# Patient Record
Sex: MALE | NOT HISPANIC OR LATINO | Employment: OTHER | URBAN - METROPOLITAN AREA
[De-identification: names, ages, dates, MRNs, and addresses within clinical notes are randomized per-mention and may not be internally consistent; named-entity substitution may affect disease eponyms.]

---

## 2021-10-20 NOTE — PROGRESS NOTES
[facility] Cardiology  [address], [address]  [place], [place], [id number] [phone]  WWW.[facility]           OUTPATIENT CLINIC CONSULTATION NOTE    Patient care team:  Patient Care Team:  Sommer Sepulveda MD as PCP - General (Family Medicine)    Requesting Provider and Reason for consultation: The patient is being seen today at the request of Sommer Sepulveda MD for dyspnea.     Subjective:   Chief complaint:   Chief Complaint   Patient presents with   • Shortness of Breath       HPI:    Jay Fagan is a 71 y.o. male.  Partial problem list, including cardiac problems:  1. Hyperlipidemia  2. Asthma  3. Anemia    The patient presents today for consultation.    He reports that he has had ongoing worsening dyspnea with exertion over the past year.  He denies chest pain, orthopnea, PND.  Has mild lower extremity edema during long trips.  Palpitations at nighttime.  He denies any prior cardiac testing.  He notes a familial history of CAD with his mother having an MI at age 64.  He denies tobacco, alcohol, or drug use.  He remains active.    Review of Systems:  Positive for dyspnea with exertion  All other systems are reviewed and are negative    PFSH:  Patient Active Problem List   Diagnosis   • Hyperlipidemia   • Asthma   • Iron deficiency anemia         Current Outpatient Medications:   •  albuterol sulfate  (90 Base) MCG/ACT inhaler, Inhale 2 puffs Every 4 (Four) Hours As Needed., Disp: , Rfl:   •  atorvastatin (LIPITOR) 10 MG tablet, Take 1 tablet by mouth Daily., Disp: , Rfl:   •  ferrous sulfate 325 (65 FE) MG tablet, Take 325 mg by mouth 3 (Three) Times a Day With Meals., Disp: , Rfl:   •  montelukast (SINGULAIR) 10 MG tablet, Take 1 tablet by mouth Daily., Disp: , Rfl:   •  multivitamin with minerals (ICAPS) tablet tablet, Take 1 tablet by mouth 2 (two) times a day., Disp: , Rfl:     No Known Allergies    Social History     Socioeconomic History   • Marital status:  "   Tobacco Use   • Smoking status: Never Smoker   • Smokeless tobacco: Never Used   Substance and Sexual Activity   • Alcohol use: Never   • Drug use: Never     Family History   Problem Relation Age of Onset   • Heart attack Mother    • Diabetes Father          Objective:   Physical Exam:  /56 (BP Location: Left arm, Patient Position: Sitting, Cuff Size: Adult)   Pulse 68   Ht 162.6 cm (64\")   Wt 76.7 kg (169 lb)   SpO2 97%   BMI 29.01 kg/m²   CONSTITUTIONAL: Well-nourished. In no acute distress.   SKIN: Warm and dry. No rashes noted  HEENT: Head is normocephalic and atraumatic.   NECK: Supple without masses or thyromegaly.   LUNGS: Normal effort. Clear to auscultation bilaterally without wheezing, rhonchi, or rales noted.   CARDIOVASCULAR: Regular rate and rhythm with a normal S1 and S2. There is no murmur, gallop, rub, or click appreciated. Carotid upstrokes are 2+ and symmetrical without bruits.  2+ radial pulses bilaterally.  There is no peripheral edema.   ABDOMEN: Normal bowel sounds.  Nondistended.  MUSCULOSKELETAL:  No digital cyanosis  NEUROLOGICAL: Nonfocal.  PSYCHIATRIC: Alert, orientated, appropriate affect and mood      Labs:  No results found for: BUN, CREATININE, K, ALT, AST  No results found for: WBC, HGB, HCT, PLT    No results found for: CHOL  No results found for: TRIG  No results found for: HDL  No results found for: LDL  No components found for: LDLDIRECTC    Diagnostic Data:      ECG 12 Lead    Date/Time: 10/22/2021 11:52 AM  Performed by: Mario Vu MD  Authorized by: Mario Vu MD   Comparison: compared with previous ECG from 9/16/2021  Similar to previous ECG  Rhythm: sinus rhythm  Rate: normal  BPM: 68  Comments: QRS 80 ms,  ms                Assessment and Plan:   Diagnoses and all orders for this visit:    Dyspnea on exertion (Primary)  Anginal equivalent (HCC)  Palpitations  -Ongoing significant dyspnea with exertion.  Likely multifactorial with chronic " anemia and asthma.  -Transthoracic echocardiogram to evaluate for structural abnormalities.  -Exercise nuclear stress test for evaluation of ischemia.  -48-hour Holter to evaluate for arrhythmia.  -Patient encouraged to exercise/walk regularly for better aerobic conditioning.    Hyperlipemia, mixed  -Statin therapy    - Return in about 6 weeks (around 12/3/2021).    Scribed for Mario Vu MD by CHRYSTAL Rodríguez. 10/22/2021  12:20 EDT     I, Mario Vu MD, personally performed the services described in this documentation as scribed by the above named individual in my presence, and it is both accurate and complete.  10/25/2021  07:19 EDT

## 2021-10-22 ENCOUNTER — OFFICE VISIT (OUTPATIENT)
Dept: CARDIOLOGY | Facility: CLINIC | Age: 71
End: 2021-10-22

## 2021-10-22 VITALS
BODY MASS INDEX: 28.85 KG/M2 | HEART RATE: 68 BPM | DIASTOLIC BLOOD PRESSURE: 56 MMHG | OXYGEN SATURATION: 97 % | WEIGHT: 169 LBS | HEIGHT: 64 IN | SYSTOLIC BLOOD PRESSURE: 118 MMHG

## 2021-10-22 DIAGNOSIS — R06.09 DYSPNEA ON EXERTION: Primary | ICD-10-CM

## 2021-10-22 DIAGNOSIS — E78.2 HYPERLIPEMIA, MIXED: ICD-10-CM

## 2021-10-22 DIAGNOSIS — I20.8 ANGINAL EQUIVALENT (HCC): ICD-10-CM

## 2021-10-22 DIAGNOSIS — R00.2 PALPITATIONS: ICD-10-CM

## 2021-10-22 PROBLEM — J45.909 ASTHMA: Status: ACTIVE | Noted: 2021-10-22

## 2021-10-22 PROBLEM — E78.5 HYPERLIPIDEMIA: Status: ACTIVE | Noted: 2021-10-22

## 2021-10-22 PROBLEM — D50.9 IRON DEFICIENCY ANEMIA: Status: ACTIVE | Noted: 2021-10-22

## 2021-10-22 PROCEDURE — 99204 OFFICE O/P NEW MOD 45 MIN: CPT | Performed by: INTERNAL MEDICINE

## 2021-10-22 PROCEDURE — 93000 ELECTROCARDIOGRAM COMPLETE: CPT | Performed by: INTERNAL MEDICINE

## 2021-10-22 RX ORDER — MULTIPLE VITAMINS W/ MINERALS TAB 9MG-400MCG
1 TAB ORAL 2 TIMES DAILY
COMMUNITY

## 2021-10-22 RX ORDER — ALBUTEROL SULFATE 90 UG/1
2 AEROSOL, METERED RESPIRATORY (INHALATION) EVERY 4 HOURS PRN
COMMUNITY
Start: 2021-09-09

## 2021-10-22 RX ORDER — ATORVASTATIN CALCIUM 10 MG/1
1 TABLET, FILM COATED ORAL DAILY
COMMUNITY
Start: 2021-09-09

## 2021-10-22 RX ORDER — FERROUS SULFATE 325(65) MG
325 TABLET ORAL
COMMUNITY

## 2021-10-22 RX ORDER — MONTELUKAST SODIUM 10 MG/1
1 TABLET ORAL DAILY
COMMUNITY
Start: 2021-09-13

## 2021-12-09 ENCOUNTER — HOSPITAL ENCOUNTER (OUTPATIENT)
Dept: CARDIOLOGY | Facility: HOSPITAL | Age: 71
Discharge: HOME | End: 2021-12-09

## 2021-12-09 VITALS — BODY MASS INDEX: 28.87 KG/M2 | WEIGHT: 169.09 LBS | HEIGHT: 64 IN

## 2021-12-09 VITALS
HEIGHT: 64 IN | BODY MASS INDEX: 28.85 KG/M2 | SYSTOLIC BLOOD PRESSURE: 130 MMHG | HEART RATE: 57 BPM | WEIGHT: 169 LBS | DIASTOLIC BLOOD PRESSURE: 90 MMHG | OXYGEN SATURATION: 95 %

## 2021-12-09 DIAGNOSIS — I20.8 ANGINAL EQUIVALENT (HCC): ICD-10-CM

## 2021-12-09 DIAGNOSIS — R06.09 DYSPNEA ON EXERTION: ICD-10-CM

## 2021-12-09 DIAGNOSIS — R00.2 PALPITATIONS: ICD-10-CM

## 2021-12-09 LAB
BH CV ECHO MEAS - AO MAX PG (FULL): 1.5 MMHG
BH CV ECHO MEAS - AO MAX PG: 4.9 MMHG
BH CV ECHO MEAS - AO MEAN PG (FULL): 0.92 MMHG
BH CV ECHO MEAS - AO MEAN PG: 2.7 MMHG
BH CV ECHO MEAS - AO ROOT AREA (BSA CORRECTED): 1.7
BH CV ECHO MEAS - AO ROOT AREA: 7.1 CM^2
BH CV ECHO MEAS - AO ROOT DIAM: 3 CM
BH CV ECHO MEAS - AO V2 MAX: 110.5 CM/SEC
BH CV ECHO MEAS - AO V2 MEAN: 78.5 CM/SEC
BH CV ECHO MEAS - AO V2 VTI: 22.8 CM
BH CV ECHO MEAS - AVA(I,A): 2.4 CM^2
BH CV ECHO MEAS - AVA(I,D): 2.4 CM^2
BH CV ECHO MEAS - AVA(V,A): 2.3 CM^2
BH CV ECHO MEAS - AVA(V,D): 2.3 CM^2
BH CV ECHO MEAS - BSA(HAYCOCK): 1.9 M^2
BH CV ECHO MEAS - BSA: 1.8 M^2
BH CV ECHO MEAS - BZI_BMI: 29 KILOGRAMS/M^2
BH CV ECHO MEAS - BZI_METRIC_HEIGHT: 162.6 CM
BH CV ECHO MEAS - BZI_METRIC_WEIGHT: 76.7 KG
BH CV ECHO MEAS - EDV(CUBED): 95.9 ML
BH CV ECHO MEAS - EDV(MOD-SP2): 55 ML
BH CV ECHO MEAS - EDV(MOD-SP4): 67 ML
BH CV ECHO MEAS - EDV(TEICH): 96.2 ML
BH CV ECHO MEAS - EF(CUBED): 72.6 %
BH CV ECHO MEAS - EF(MOD-BP): 61 %
BH CV ECHO MEAS - EF(MOD-SP2): 61.8 %
BH CV ECHO MEAS - EF(MOD-SP4): 61.2 %
BH CV ECHO MEAS - EF(TEICH): 64.4 %
BH CV ECHO MEAS - ESV(CUBED): 26.3 ML
BH CV ECHO MEAS - ESV(MOD-SP2): 21 ML
BH CV ECHO MEAS - ESV(MOD-SP4): 26 ML
BH CV ECHO MEAS - ESV(TEICH): 34.3 ML
BH CV ECHO MEAS - FS: 35 %
BH CV ECHO MEAS - IVS/LVPW: 1.2
BH CV ECHO MEAS - IVSD: 0.96 CM
BH CV ECHO MEAS - LA DIMENSION: 3.3 CM
BH CV ECHO MEAS - LA/AO: 1.1
BH CV ECHO MEAS - LAD MAJOR: 4.6 CM
BH CV ECHO MEAS - LAT PEAK E' VEL: 8.5 CM/SEC
BH CV ECHO MEAS - LATERAL E/E' RATIO: 10.8
BH CV ECHO MEAS - LV DIASTOLIC VOL/BSA (35-75): 36.8 ML/M^2
BH CV ECHO MEAS - LV IVRT: 0.07 SEC
BH CV ECHO MEAS - LV MASS(C)D: 135 GRAMS
BH CV ECHO MEAS - LV MASS(C)DI: 74.1 GRAMS/M^2
BH CV ECHO MEAS - LV MAX PG: 3.4 MMHG
BH CV ECHO MEAS - LV MEAN PG: 1.8 MMHG
BH CV ECHO MEAS - LV SYSTOLIC VOL/BSA (12-30): 14.3 ML/M^2
BH CV ECHO MEAS - LV V1 MAX: 92.4 CM/SEC
BH CV ECHO MEAS - LV V1 MEAN: 62 CM/SEC
BH CV ECHO MEAS - LV V1 VTI: 19.9 CM
BH CV ECHO MEAS - LVIDD: 4.6 CM
BH CV ECHO MEAS - LVIDS: 3 CM
BH CV ECHO MEAS - LVLD AP2: 6.5 CM
BH CV ECHO MEAS - LVLD AP4: 6.9 CM
BH CV ECHO MEAS - LVLS AP2: 5.5 CM
BH CV ECHO MEAS - LVLS AP4: 5.6 CM
BH CV ECHO MEAS - LVOT AREA (M): 2.8 CM^2
BH CV ECHO MEAS - LVOT AREA: 2.7 CM^2
BH CV ECHO MEAS - LVOT DIAM: 1.9 CM
BH CV ECHO MEAS - LVPWD: 0.83 CM
BH CV ECHO MEAS - MED PEAK E' VEL: 9.2 CM/SEC
BH CV ECHO MEAS - MEDIAL E/E' RATIO: 9.9
BH CV ECHO MEAS - MV A MAX VEL: 74.8 CM/SEC
BH CV ECHO MEAS - MV DEC SLOPE: 381.8 CM/SEC^2
BH CV ECHO MEAS - MV DEC TIME: 0.19 SEC
BH CV ECHO MEAS - MV E MAX VEL: 92.5 CM/SEC
BH CV ECHO MEAS - MV E/A: 1.2
BH CV ECHO MEAS - MV MAX PG: 5.1 MMHG
BH CV ECHO MEAS - MV MEAN PG: 2 MMHG
BH CV ECHO MEAS - MV P1/2T MAX VEL: 112.9 CM/SEC
BH CV ECHO MEAS - MV P1/2T: 86.6 MSEC
BH CV ECHO MEAS - MV V2 MAX: 113.4 CM/SEC
BH CV ECHO MEAS - MV V2 MEAN: 62 CM/SEC
BH CV ECHO MEAS - MV V2 VTI: 39.3 CM
BH CV ECHO MEAS - MVA P1/2T LCG: 1.9 CM^2
BH CV ECHO MEAS - MVA(P1/2T): 2.5 CM^2
BH CV ECHO MEAS - MVA(VTI): 1.4 CM^2
BH CV ECHO MEAS - PA ACC SLOPE: 578.9 CM/SEC^2
BH CV ECHO MEAS - PA ACC TIME: 0.12 SEC
BH CV ECHO MEAS - PA MAX PG: 4.3 MMHG
BH CV ECHO MEAS - PA PR(ACCEL): 23.5 MMHG
BH CV ECHO MEAS - PA V2 MAX: 103.2 CM/SEC
BH CV ECHO MEAS - PI END-D VEL: 60.5 CM/SEC
BH CV ECHO MEAS - RAP SYSTOLE: 3 MMHG
BH CV ECHO MEAS - RVSP: 37 MMHG
BH CV ECHO MEAS - SI(AO): 89.3 ML/M^2
BH CV ECHO MEAS - SI(CUBED): 38.2 ML/M^2
BH CV ECHO MEAS - SI(LVOT): 30 ML/M^2
BH CV ECHO MEAS - SI(MOD-SP2): 18.7 ML/M^2
BH CV ECHO MEAS - SI(MOD-SP4): 22.5 ML/M^2
BH CV ECHO MEAS - SI(TEICH): 34 ML/M^2
BH CV ECHO MEAS - SV(AO): 162.6 ML
BH CV ECHO MEAS - SV(CUBED): 69.6 ML
BH CV ECHO MEAS - SV(LVOT): 54.6 ML
BH CV ECHO MEAS - SV(MOD-SP2): 34 ML
BH CV ECHO MEAS - SV(MOD-SP4): 41 ML
BH CV ECHO MEAS - SV(TEICH): 62 ML
BH CV ECHO MEAS - TAPSE (>1.6): 2 CM
BH CV ECHO MEAS - TR MAX PG: 34 MMHG
BH CV ECHO MEAS - TR MAX VEL: 289.9 CM/SEC
BH CV ECHO MEASUREMENTS AVERAGE E/E' RATIO: 10.45
BH CV REST NUCLEAR ISOTOPE DOSE: 9.9 MCI
BH CV STRESS BP STAGE 2: NORMAL
BH CV STRESS BP STAGE 4: NORMAL
BH CV STRESS COMMENTS STAGE 1: NORMAL
BH CV STRESS DOSE REGADENOSON STAGE 1: 0.4
BH CV STRESS DURATION MIN STAGE 1: 1
BH CV STRESS DURATION MIN STAGE 2: 1
BH CV STRESS DURATION MIN STAGE 3: 1
BH CV STRESS DURATION MIN STAGE 4: 1
BH CV STRESS DURATION SEC STAGE 1: 0
BH CV STRESS DURATION SEC STAGE 2: 0
BH CV STRESS DURATION SEC STAGE 3: 0
BH CV STRESS DURATION SEC STAGE 4: 0
BH CV STRESS HR STAGE 1: 68
BH CV STRESS HR STAGE 2: 91
BH CV STRESS HR STAGE 3: 85
BH CV STRESS HR STAGE 4: 76
BH CV STRESS NUCLEAR ISOTOPE DOSE: 32.1 MCI
BH CV STRESS O2 STAGE 1: 96
BH CV STRESS O2 STAGE 2: 95
BH CV STRESS O2 STAGE 3: 98
BH CV STRESS O2 STAGE 4: 97
BH CV STRESS PROTOCOL 1: NORMAL
BH CV STRESS RECOVERY BP: NORMAL MMHG
BH CV STRESS RECOVERY HR: 61 BPM
BH CV STRESS RECOVERY O2: 96 %
BH CV STRESS STAGE 1: 1
BH CV STRESS STAGE 2: 2
BH CV STRESS STAGE 3: 3
BH CV STRESS STAGE 4: 4
BH CV VAS BP LEFT ARM: NORMAL MMHG
BH CV XLRA - RV BASE: 4 CM
BH CV XLRA - RV LENGTH: 6.3 CM
BH CV XLRA - RV MID: 3.4 CM
BH CV XLRA - TDI S': 12.6 CM/SEC
LEFT ATRIUM VOLUME INDEX: 26.4 ML/M^2
LEFT ATRIUM VOLUME: 48 ML
LV EF 2D ECHO EST: 60 %
LV EF NUC BP: 73 %
MAXIMAL PREDICTED HEART RATE: 147 BPM
PERCENT MAX PREDICTED HR: 65.99 %
STRESS BASELINE BP: NORMAL MMHG
STRESS BASELINE HR: 59 BPM
STRESS O2 SAT REST: 95 %
STRESS PERCENT HR: 78 %
STRESS POST ESTIMATED WORKLOAD: 1 METS
STRESS POST EXERCISE DUR MIN: 4 MIN
STRESS POST EXERCISE DUR SEC: 0 SEC
STRESS POST O2 SAT PEAK: 97 %
STRESS POST PEAK BP: NORMAL MMHG
STRESS POST PEAK HR: 97 BPM
STRESS TARGET HR: 125 BPM

## 2021-12-09 PROCEDURE — 93306 TTE W/DOPPLER COMPLETE: CPT | Performed by: INTERNAL MEDICINE

## 2021-12-09 PROCEDURE — 78452 HT MUSCLE IMAGE SPECT MULT: CPT

## 2021-12-09 PROCEDURE — 25010000002 REGADENOSON 0.4 MG/5ML SOLUTION: Performed by: NURSE PRACTITIONER

## 2021-12-09 PROCEDURE — 0 TECHNETIUM SESTAMIBI: Performed by: NURSE PRACTITIONER

## 2021-12-09 PROCEDURE — A9500 TC99M SESTAMIBI: HCPCS | Performed by: NURSE PRACTITIONER

## 2021-12-09 PROCEDURE — 93306 TTE W/DOPPLER COMPLETE: CPT

## 2021-12-09 PROCEDURE — 93018 CV STRESS TEST I&R ONLY: CPT | Performed by: INTERNAL MEDICINE

## 2021-12-09 PROCEDURE — 93017 CV STRESS TEST TRACING ONLY: CPT

## 2021-12-09 PROCEDURE — 78452 HT MUSCLE IMAGE SPECT MULT: CPT | Performed by: INTERNAL MEDICINE

## 2021-12-09 RX ADMIN — TECHNETIUM TC 99M SESTAMIBI 1 DOSE: 1 INJECTION INTRAVENOUS at 08:15

## 2021-12-09 RX ADMIN — TECHNETIUM TC 99M SESTAMIBI 1 DOSE: 1 INJECTION INTRAVENOUS at 10:10

## 2021-12-09 RX ADMIN — REGADENOSON 0.4 MG: 0.08 INJECTION, SOLUTION INTRAVENOUS at 10:07

## 2022-04-27 NOTE — PROGRESS NOTES
[facility] Cardiology    Encounter Date: 2022    Patient ID: Jay Fagan is a 71 y.o. male.  : 1950     PCP: Sommer Sepulveda MD       Chief Complaint: No chief complaint on file.      PROBLEM LIST:  1. Dyspnea on exertion:  a. MPS 2021: EF >70%. Normal study. Low risk study.  b. Echocardiogram 2021: LVEF 60%. Trace to mild MR. Mild TR with RVSP 37 mmHg.   2. Palpitations  3. Hyperlipidemia  4. Asthma  5. Chronic iron deficiency anemia (patient is vegetarian)    History of Present Illness  Patient presents today for a follow-up with a history of dyspnea on exertion, palpitation and cardiac risk factors. Since last visit, he had cardiac testing including an echocardiogram and stress test which were unremarkable.  He continues to complain of exertional dyspnea.  States that he walks about 30 to 45 minutes a day on flat ground without any problems however when he climbs stairs or rushes he gets short of breath.  Denies chest pain palpitations dizziness edema orthopnea or PND.  He has had chronic iron deficiency anemia and has been on iron supplementation.  He is a vegetarian.  States that he had recent labs with PCP.    No Known Allergies      Current Outpatient Medications:   •  albuterol sulfate  (90 Base) MCG/ACT inhaler, Inhale 2 puffs Every 4 (Four) Hours As Needed., Disp: , Rfl:   •  atorvastatin (LIPITOR) 10 MG tablet, Take 1 tablet by mouth Daily., Disp: , Rfl:   •  ferrous sulfate 325 (65 FE) MG tablet, Take 325 mg by mouth 3 (Three) Times a Day With Meals., Disp: , Rfl:   •  latanoprost (XALATAN) 0.005 % ophthalmic solution, Administer 1 drop into the left eye Daily., Disp: , Rfl:   •  montelukast (SINGULAIR) 10 MG tablet, Take 1 tablet by mouth Daily., Disp: , Rfl:   •  multivitamin with minerals tablet tablet, Take 1 tablet by mouth 2 (two) times a day., Disp: , Rfl:   •  Omega-3 Fatty Acids (fish oil) 1000 MG capsule capsule, Take 1,000 mg  "by mouth 2 (Two) Times a Day With Meals., Disp: , Rfl:     The following portions of the patient's history were reviewed and updated as appropriate: allergies, current medications, past family history, past medical history, past social history, past surgical history and problem list.    ROS  Review of Systems   Constitution: Negative for chills, fever, fatigue, generalized weakness.   Cardiovascular: Negative for chest pain, dyspnea on exertion, leg swelling, palpitations, orthopnea, and syncope.   Respiratory: Negative for cough, shortness of breath, and wheezing.  HENT: Negative for ear pain, nosebleeds, and tinnitus.  Gastrointestinal: Negative for abdominal pain, constipation, diarrhea, nausea and vomiting.   Genitourinary: No urinary symptoms.  Musculoskeletal: Negative for muscle cramps.  Neurological: Negative for dizziness, headaches, loss of balance, numbness, and symptoms of stroke.  Psychiatric: Normal mental status.     All other systems reviewed and are negative.        Objective:     /70 (BP Location: Right arm, Patient Position: Sitting, Cuff Size: Adult)   Pulse 68   Ht 162.6 cm (64\")   Wt 76.2 kg (168 lb)   SpO2 96%   BMI 28.84 kg/m²    BP repeat: 110/60    Physical Exam  Constitutional: Patient appears well-developed and well-nourished.   HENT: HEENT exam unremarkable.   Neck: Neck supple. No JVD present. No carotid bruits.   Cardiovascular: Normal rate, regular rhythm and normal heart sounds. No murmur heard.   2+ symmetric pulses.   Pulmonary/Chest: Breath sounds normal. Does not exhibit tenderness.   Abdominal: Abdomen benign.   Musculoskeletal: Does not exhibit edema.   Neurological: Neurological exam unremarkable.   Vitals reviewed.    Data Review:     No recent laboratory studies available for review today.    Procedures             Assessment:      Diagnosis Plan   1. Dyspnea on exertion   suspected angina equivalent, negative echo and Cardiolite stress test.  Patient reassured, " recommended regular exercise for fitness.   2. Hyperlipemia, mixed   monitored by PCP, continue current dose of statin.   3. Palpitations  Holter Monitor - 48 Hour to be repeated, previous monitor was lost in mail.     Plan:   The patient is overall stable from cardiac standpoint without significant anginal symptoms.  He continues to report exertional dyspnea which appears multifactorial and a combination of deconditioning, chronic anemia and underlying asthma.  For now I have recommended regular exercise to include walking uphill or stair climbing for better fitness.  Continue management of anemia and dyslipidemia per PCP guidance.  Labs obtained from PCP office and reviewed.  His most recent hemoglobin was 11.4 with hematocrit 37.2.  Lipid profile is acceptable with exception of mildly elevated triglycerides of 178.  His previous monitor was lost in mail, I am still concerned about exertional arrhythmias causing dyspnea therefore we will repeat a 48-hour Holter.  Continue current medications.   FU in 6 MO, sooner as needed.  Thank you for allowing us to participate in the care of your patient.     Scribed for Mario Vu MD by Ivana Roque. 4/29/2022 09:07 EDT     I, Mario Vu MD, personally performed the services described in this documentation as scribed by the above named individual in my presence, and it is both accurate and complete.  4/29/2022  09:27 EDT      Please note that portions of this note may have been completed with a voice recognition program. Efforts were made to edit the dictations, but occasionally words are mistranscribed.

## 2022-04-29 ENCOUNTER — OFFICE VISIT (OUTPATIENT)
Dept: CARDIOLOGY | Facility: CLINIC | Age: 72
End: 2022-04-29

## 2022-04-29 VITALS
WEIGHT: 168 LBS | BODY MASS INDEX: 28.68 KG/M2 | HEIGHT: 64 IN | OXYGEN SATURATION: 96 % | SYSTOLIC BLOOD PRESSURE: 114 MMHG | HEART RATE: 68 BPM | DIASTOLIC BLOOD PRESSURE: 70 MMHG

## 2022-04-29 DIAGNOSIS — R06.09 DYSPNEA ON EXERTION: Primary | ICD-10-CM

## 2022-04-29 DIAGNOSIS — E78.2 HYPERLIPEMIA, MIXED: ICD-10-CM

## 2022-04-29 DIAGNOSIS — R00.2 PALPITATIONS: ICD-10-CM

## 2022-04-29 PROCEDURE — 99213 OFFICE O/P EST LOW 20 MIN: CPT | Performed by: INTERNAL MEDICINE

## 2022-04-29 RX ORDER — CHLORAL HYDRATE 500 MG
1000 CAPSULE ORAL 2 TIMES DAILY WITH MEALS
COMMUNITY

## 2022-04-29 RX ORDER — LATANOPROST 50 UG/ML
1 SOLUTION/ DROPS OPHTHALMIC DAILY
COMMUNITY
Start: 2022-03-25

## 2024-09-27 ENCOUNTER — OFFICE VISIT (OUTPATIENT)
Dept: CARDIOLOGY | Facility: CLINIC | Age: 74
End: 2024-09-27
Payer: MEDICARE

## 2024-09-27 VITALS
SYSTOLIC BLOOD PRESSURE: 104 MMHG | HEIGHT: 63 IN | WEIGHT: 167.8 LBS | DIASTOLIC BLOOD PRESSURE: 60 MMHG | BODY MASS INDEX: 29.73 KG/M2 | OXYGEN SATURATION: 95 % | HEART RATE: 70 BPM

## 2024-09-27 DIAGNOSIS — R06.09 DOE (DYSPNEA ON EXERTION): ICD-10-CM

## 2024-09-27 DIAGNOSIS — R00.2 PALPITATIONS: ICD-10-CM

## 2024-09-27 DIAGNOSIS — E78.5 DYSLIPIDEMIA: ICD-10-CM

## 2024-09-27 DIAGNOSIS — I20.0 UNSTABLE ANGINA PECTORIS: Primary | ICD-10-CM

## 2024-09-27 PROCEDURE — 1159F MED LIST DOCD IN RCRD: CPT | Performed by: INTERNAL MEDICINE

## 2024-09-27 PROCEDURE — 1160F RVW MEDS BY RX/DR IN RCRD: CPT | Performed by: INTERNAL MEDICINE

## 2024-09-27 PROCEDURE — 99214 OFFICE O/P EST MOD 30 MIN: CPT | Performed by: INTERNAL MEDICINE

## 2024-09-27 RX ORDER — THIAMINE HCL 100 MG
1 TABLET ORAL DAILY
COMMUNITY

## 2024-09-27 RX ORDER — NITROGLYCERIN 0.4 MG/1
0.4 TABLET SUBLINGUAL
COMMUNITY
Start: 2024-08-21

## 2024-09-27 RX ORDER — MULTIPLE VITAMINS W/ MINERALS TAB 9MG-400MCG
1 TAB ORAL DAILY
COMMUNITY
End: 2024-09-27

## 2024-09-27 NOTE — H&P (VIEW-ONLY)
[facility] Cardiology    Encounter Date: 2024    Patient ID: Sanmukhlaseda Fagan is a 74 y.o. male.  : 1950     PCP: Sommer Sepulveda MD       Chief Complaint: SUMMERS, Hyperlipidemia, Palpitations, and Chest Pain (On exertion, occurred once)      PROBLEM LIST:  Dyspnea on exertion:  MPS 2021: EF >70%. Normal study. Low risk study.  Echocardiogram 2021: LVEF 60%. Trace to mild MR. Mild TR with RVSP 37 mmHg.   Palpitations  Hyperlipidemia  Asthma  Chronic iron deficiency anemia (patient is vegetarian)    History of Present Illness  Patient presents today for a follow-up with a history of SUMMERS and cardiac risk factors. Since last visit, patient reports a significant decline in his condition.  States that activities such as climbing stairs and walking uphill causes him to feel fatigued with chest pressure and shortness of breath.  He has to rest for a while before he improves.  His PCP recently prescribed nitroglycerin which she has not yet taken.  His blood pressure typically runs low and he is not able to tolerate any other medications.  He tries to walk around the house but lately has noted significant decline in his exercise capacity.  Denying edema orthopnea PND palpitations or syncope.     No Known Allergies      Current Outpatient Medications:     albuterol sulfate  (90 Base) MCG/ACT inhaler, Inhale 2 puffs Every 4 (Four) Hours As Needed., Disp: , Rfl:     atorvastatin (LIPITOR) 10 MG tablet, Take 1 tablet by mouth Daily., Disp: , Rfl:     Calcium Citrate-Vitamin D3 (CITRACAL) 315-6.25 MG-MCG tablet tablet, Take 1 tablet by mouth Daily., Disp: , Rfl:     ferrous sulfate 325 (65 FE) MG tablet, Take 1 tablet by mouth 3 (Three) Times a Day With Meals., Disp: , Rfl:     latanoprost (XALATAN) 0.005 % ophthalmic solution, Administer 1 drop to both eyes Daily., Disp: , Rfl:     montelukast (SINGULAIR) 10 MG tablet, Take 1 tablet by mouth Daily., Disp: , Rfl:      "Multiple Vitamins-Minerals (ICAPS AREDS 2 PO), Take  by mouth Daily., Disp: , Rfl:     multivitamin with minerals tablet tablet, Take 1 tablet by mouth Daily., Disp: , Rfl:     nitroglycerin (NITROSTAT) 0.4 MG SL tablet, Take 1 tablet by mouth Every 5 (Five) Minutes As Needed., Disp: , Rfl:     Omega-3 Fatty Acids (fish oil) 1000 MG capsule capsule, Take 1 capsule by mouth 2 (Two) Times a Day With Meals., Disp: , Rfl:     The following portions of the patient's history were reviewed and updated as appropriate: allergies, current medications, past family history, past medical history, past social history, past surgical history and problem list.    ROS  Review of Systems   14 point ROS negative except for that listed in the HPI.         Objective:     /60   Pulse 70   Ht 160 cm (63\")   Wt 76.1 kg (167 lb 12.8 oz)   SpO2 95%   BMI 29.72 kg/m²      Physical Exam  Constitutional: Patient appears well-developed and well-nourished.   HENT: HEENT exam unremarkable.   Neck: Neck supple. No JVD present. No carotid bruits.   Cardiovascular: Normal rate, regular rhythm and normal heart sounds. No murmur heard.   2+ symmetric pulses.   Pulmonary/Chest: Breath sounds normal. Does not exhibit tenderness.   Abdominal: Abdomen benign.   Musculoskeletal: Does not exhibit edema.   Neurological: Neurological exam unremarkable.   Vitals reviewed.    Data Review:   Lab date: 08/20/2024  FLP: , , HDL 41, LDL 81  CMP: Glu 97, BUN 14, Creat 1.12, eGFR 68, Na 138, K 4.4, Cl 100, CO2 24, Alk Phos 64, AST 30, ALT 25  CBC: WBC 12.3, RBC 4.96, HGB 11.4, HCT 38, MCV 77, MCH 23,   HbA1c: 6.2     Procedures       Advance Care Planning   ACP discussion was declined by the patient. Patient does not have an advance directive, declines further assistance.           Assessment:      Diagnosis Plan   1. Unstable angina pectoris  Patient with multiple risk factors and recent complaints of progressive decline in exercise " capacity with exertional dyspnea and chest pressure with features of unstable angina.  He is not a suitable candidate for antianginal therapy due to borderline blood pressure.  I have recommended aspirin 81 mg daily and he can take nitroglycerin as needed.  We we will schedule cardiac allergen study for further evaluation and in case of worsening chest pain is advised to go to the emergency room.   2. SUMMERS (dyspnea on exertion)  As above.      3. Palpitations  Controlled.      4. Dyslipidemia  Continue statin therapy.        Plan:   Patient presenting with progressive angina with features of unstable angina in the setting of multiple risk factors.  Not a suitable candidate for antianginal therapy due to borderline blood pressure.  Add aspirin 81 mg daily, continue nitroglycerin as needed.  In case of significant symptoms not improving with rest or nitroglycerin go to the emergency room.  Cardiac catheterization to be scheduled for further evaluation.  This will be followed by PCI if indicated.  The procedure was explained to the patient/family extensively. Indications, benefits, risks and alternatives were discussed. The patient understands well, and wishes to proceed.   Continue current medications.   FU after procedure.  Thank you for allowing us to participate in the care of your patient.     Scribed for Mario Vu MD by Zara Delgado. 9/27/2024 12:02 EDT    I, Mario Vu MD, personally performed the services described in this documentation as scribed by the above named individual in my presence, and it is both accurate and complete.  9/27/2024  14:32 EDT      Please note that portions of this note may have been completed with a voice recognition program. Efforts were made to edit the dictations, but occasionally words are mistranscribed.

## 2024-09-30 ENCOUNTER — PREP FOR SURGERY (OUTPATIENT)
Dept: OTHER | Facility: HOSPITAL | Age: 74
End: 2024-09-30
Payer: MEDICARE

## 2024-09-30 RX ORDER — ASPIRIN 81 MG/1
81 TABLET ORAL DAILY
Status: CANCELLED | OUTPATIENT
Start: 2024-10-01

## 2024-09-30 RX ORDER — SODIUM CHLORIDE 9 MG/ML
40 INJECTION, SOLUTION INTRAVENOUS AS NEEDED
Status: CANCELLED | OUTPATIENT
Start: 2024-09-30

## 2024-09-30 RX ORDER — SODIUM CHLORIDE 0.9 % (FLUSH) 0.9 %
10 SYRINGE (ML) INJECTION AS NEEDED
Status: CANCELLED | OUTPATIENT
Start: 2024-09-30

## 2024-09-30 RX ORDER — SODIUM CHLORIDE 0.9 % (FLUSH) 0.9 %
10 SYRINGE (ML) INJECTION EVERY 12 HOURS SCHEDULED
Status: CANCELLED | OUTPATIENT
Start: 2024-09-30

## 2024-09-30 RX ORDER — ASPIRIN 81 MG/1
324 TABLET, CHEWABLE ORAL ONCE
Status: CANCELLED | OUTPATIENT
Start: 2024-09-30 | End: 2024-09-30

## 2024-10-03 ENCOUNTER — HOSPITAL ENCOUNTER (OUTPATIENT)
Facility: HOSPITAL | Age: 74
Setting detail: HOSPITAL OUTPATIENT SURGERY
Discharge: HOME | End: 2024-10-03
Attending: INTERNAL MEDICINE | Admitting: INTERNAL MEDICINE
Payer: MEDICARE

## 2024-10-03 VITALS
DIASTOLIC BLOOD PRESSURE: 66 MMHG | RESPIRATION RATE: 16 BRPM | SYSTOLIC BLOOD PRESSURE: 143 MMHG | OXYGEN SATURATION: 90 % | HEIGHT: 63 IN | WEIGHT: 166 LBS | HEART RATE: 84 BPM | BODY MASS INDEX: 29.41 KG/M2 | TEMPERATURE: 96.4 F

## 2024-10-03 DIAGNOSIS — R06.09 DOE (DYSPNEA ON EXERTION): ICD-10-CM

## 2024-10-03 DIAGNOSIS — I20.0 UNSTABLE ANGINA PECTORIS: ICD-10-CM

## 2024-10-03 LAB
ALBUMIN SERPL-MCNC: 5 G/DL (ref 3.5–5.2)
ALBUMIN/GLOB SERPL: 1.6 G/DL
ALP SERPL-CCNC: 72 U/L (ref 39–117)
ALT SERPL W P-5'-P-CCNC: 27 U/L (ref 1–41)
ANION GAP SERPL CALCULATED.3IONS-SCNC: 12 MMOL/L (ref 5–15)
AST SERPL-CCNC: 35 U/L (ref 1–40)
BASOPHILS # BLD AUTO: 0.15 10*3/MM3 (ref 0–0.2)
BASOPHILS NFR BLD AUTO: 1.3 % (ref 0–1.5)
BILIRUB SERPL-MCNC: 1.7 MG/DL (ref 0–1.2)
BUN SERPL-MCNC: 12 MG/DL (ref 8–23)
BUN/CREAT SERPL: 12.1 (ref 7–25)
CALCIUM SPEC-SCNC: 9.6 MG/DL (ref 8.6–10.5)
CHLORIDE SERPL-SCNC: 102 MMOL/L (ref 98–107)
CO2 SERPL-SCNC: 25 MMOL/L (ref 22–29)
CREAT SERPL-MCNC: 0.99 MG/DL (ref 0.76–1.27)
DEPRECATED RDW RBC AUTO: 43.5 FL (ref 37–54)
EGFRCR SERPLBLD CKD-EPI 2021: 78.9 ML/MIN/1.73
EOSINOPHIL # BLD AUTO: 1.02 10*3/MM3 (ref 0–0.4)
EOSINOPHIL NFR BLD AUTO: 8.6 % (ref 0.3–6.2)
ERYTHROCYTE [DISTWIDTH] IN BLOOD BY AUTOMATED COUNT: 17.7 % (ref 12.3–15.4)
GLOBULIN UR ELPH-MCNC: 3.1 GM/DL
GLUCOSE SERPL-MCNC: 103 MG/DL (ref 65–99)
HCT VFR BLD AUTO: 35.6 % (ref 37.5–51)
HGB BLD-MCNC: 10.9 G/DL (ref 13–17.7)
IMM GRANULOCYTES # BLD AUTO: 0.04 10*3/MM3 (ref 0–0.05)
IMM GRANULOCYTES NFR BLD AUTO: 0.3 % (ref 0–0.5)
LYMPHOCYTES # BLD AUTO: 3.25 10*3/MM3 (ref 0.7–3.1)
LYMPHOCYTES NFR BLD AUTO: 27.4 % (ref 19.6–45.3)
MCH RBC QN AUTO: 22.5 PG (ref 26.6–33)
MCHC RBC AUTO-ENTMCNC: 30.6 G/DL (ref 31.5–35.7)
MCV RBC AUTO: 73.4 FL (ref 79–97)
MONOCYTES # BLD AUTO: 1.12 10*3/MM3 (ref 0.1–0.9)
MONOCYTES NFR BLD AUTO: 9.4 % (ref 5–12)
NEUTROPHILS NFR BLD AUTO: 53 % (ref 42.7–76)
NEUTROPHILS NFR BLD AUTO: 6.3 10*3/MM3 (ref 1.7–7)
NRBC BLD AUTO-RTO: 2.8 /100 WBC (ref 0–0.2)
PLATELET # BLD AUTO: 227 10*3/MM3 (ref 140–450)
PMV BLD AUTO: 11.7 FL (ref 6–12)
POTASSIUM SERPL-SCNC: 4.6 MMOL/L (ref 3.5–5.2)
PROT SERPL-MCNC: 8.1 G/DL (ref 6–8.5)
RBC # BLD AUTO: 4.85 10*6/MM3 (ref 4.14–5.8)
SODIUM SERPL-SCNC: 139 MMOL/L (ref 136–145)
WBC NRBC COR # BLD AUTO: 11.88 10*3/MM3 (ref 3.4–10.8)

## 2024-10-03 PROCEDURE — 25010000002 MIDAZOLAM PER 1 MG: Performed by: INTERNAL MEDICINE

## 2024-10-03 PROCEDURE — 25010000002 NICARDIPINE 2.5 MG/ML SOLUTION: Performed by: INTERNAL MEDICINE

## 2024-10-03 PROCEDURE — C1894 INTRO/SHEATH, NON-LASER: HCPCS | Performed by: INTERNAL MEDICINE

## 2024-10-03 PROCEDURE — 93458 L HRT ARTERY/VENTRICLE ANGIO: CPT | Performed by: INTERNAL MEDICINE

## 2024-10-03 PROCEDURE — 25810000003 SODIUM CHLORIDE 0.9 % SOLUTION: Performed by: INTERNAL MEDICINE

## 2024-10-03 PROCEDURE — 25010000002 HEPARIN (PORCINE) PER 1000 UNITS: Performed by: INTERNAL MEDICINE

## 2024-10-03 PROCEDURE — 80053 COMPREHEN METABOLIC PANEL: CPT | Performed by: INTERNAL MEDICINE

## 2024-10-03 PROCEDURE — C1769 GUIDE WIRE: HCPCS | Performed by: INTERNAL MEDICINE

## 2024-10-03 PROCEDURE — 36415 COLL VENOUS BLD VENIPUNCTURE: CPT

## 2024-10-03 PROCEDURE — 25810000003 SODIUM CHLORIDE 0.9 % SOLUTION

## 2024-10-03 PROCEDURE — 25510000001 IOPAMIDOL PER 1 ML: Performed by: INTERNAL MEDICINE

## 2024-10-03 PROCEDURE — 25010000002 LIDOCAINE PF 1% 1 % SOLUTION: Performed by: INTERNAL MEDICINE

## 2024-10-03 PROCEDURE — 85025 COMPLETE CBC W/AUTO DIFF WBC: CPT | Performed by: INTERNAL MEDICINE

## 2024-10-03 RX ORDER — MIDAZOLAM HYDROCHLORIDE 1 MG/ML
INJECTION INTRAMUSCULAR; INTRAVENOUS
Status: DISCONTINUED | OUTPATIENT
Start: 2024-10-03 | End: 2024-10-03 | Stop reason: HOSPADM

## 2024-10-03 RX ORDER — HEPARIN SODIUM 1000 [USP'U]/ML
INJECTION, SOLUTION INTRAVENOUS; SUBCUTANEOUS
Status: DISCONTINUED | OUTPATIENT
Start: 2024-10-03 | End: 2024-10-03 | Stop reason: HOSPADM

## 2024-10-03 RX ORDER — SODIUM CHLORIDE 0.9 % (FLUSH) 0.9 %
10 SYRINGE (ML) INJECTION AS NEEDED
Status: DISCONTINUED | OUTPATIENT
Start: 2024-10-03 | End: 2024-10-03 | Stop reason: HOSPADM

## 2024-10-03 RX ORDER — LIDOCAINE HYDROCHLORIDE 10 MG/ML
INJECTION, SOLUTION EPIDURAL; INFILTRATION; INTRACAUDAL; PERINEURAL
Status: DISCONTINUED | OUTPATIENT
Start: 2024-10-03 | End: 2024-10-03 | Stop reason: HOSPADM

## 2024-10-03 RX ORDER — SODIUM CHLORIDE 9 MG/ML
40 INJECTION, SOLUTION INTRAVENOUS AS NEEDED
Status: DISCONTINUED | OUTPATIENT
Start: 2024-10-03 | End: 2024-10-03 | Stop reason: HOSPADM

## 2024-10-03 RX ORDER — METOPROLOL SUCCINATE 25 MG/1
25 TABLET, EXTENDED RELEASE ORAL DAILY
Qty: 90 TABLET | Refills: 3 | Status: SHIPPED | OUTPATIENT
Start: 2024-10-03

## 2024-10-03 RX ORDER — SODIUM CHLORIDE 0.9 % (FLUSH) 0.9 %
10 SYRINGE (ML) INJECTION EVERY 12 HOURS SCHEDULED
Status: DISCONTINUED | OUTPATIENT
Start: 2024-10-03 | End: 2024-10-03 | Stop reason: HOSPADM

## 2024-10-03 RX ORDER — ASPIRIN 81 MG/1
81 TABLET ORAL DAILY
Status: DISCONTINUED | OUTPATIENT
Start: 2024-10-04 | End: 2024-10-03 | Stop reason: HOSPADM

## 2024-10-03 RX ORDER — SODIUM CHLORIDE 9 MG/ML
100 INJECTION, SOLUTION INTRAVENOUS CONTINUOUS
Status: DISCONTINUED | OUTPATIENT
Start: 2024-10-03 | End: 2024-10-03 | Stop reason: HOSPADM

## 2024-10-03 RX ORDER — IOPAMIDOL 755 MG/ML
INJECTION, SOLUTION INTRAVASCULAR
Status: DISCONTINUED | OUTPATIENT
Start: 2024-10-03 | End: 2024-10-03 | Stop reason: HOSPADM

## 2024-10-03 RX ORDER — NITROGLYCERIN 0.4 MG/1
0.4 TABLET SUBLINGUAL
Status: DISCONTINUED | OUTPATIENT
Start: 2024-10-03 | End: 2024-10-03 | Stop reason: HOSPADM

## 2024-10-03 RX ORDER — ASPIRIN 81 MG/1
324 TABLET, CHEWABLE ORAL ONCE
Status: COMPLETED | OUTPATIENT
Start: 2024-10-03 | End: 2024-10-03

## 2024-10-03 RX ADMIN — SODIUM CHLORIDE 228.3 ML: 9 INJECTION, SOLUTION INTRAVENOUS at 07:56

## 2024-10-03 RX ADMIN — ASPIRIN 81 MG 324 MG: 81 TABLET ORAL at 07:55

## 2024-10-03 NOTE — INTERVAL H&P NOTE
H&P reviewed. The patient was examined and there are no changes to the H&P.      No change in the physical exam      Active Hospital Problems    Diagnosis     **SUMMERS (dyspnea on exertion)     Unstable angina pectoris     Dyslipidemia    Patient presents today to undergo cardiac catheterization for CCS/NYHA class II-III symptoms concerning for anginal equivalent.  Risks and benefits of the procedure were discussed and the patient is agreeable to proceed.  Of note he has been premedicated with 324 mg aspirin today.      Plan:  Lab results pending and if acceptable proceed with cardiac cath via the right radial approach  Further recommendations to follow      Romi ROTHMAN

## 2024-10-03 NOTE — CONSULTS
Diabetes Education    Patient Name:  Michael Fagan  YOB: 1950  MRN: [id number]  Admit Date:  10/3/2024      Order criteria not met for diabetes education consult. Current A1c not available, but pt has no history of DM and no home meds for DM. Thank you.      Electronically signed by:  Dominique Dawson RN  10/03/24 10:11 EDT

## 2024-12-02 PROBLEM — R00.2 PALPITATIONS: Status: ACTIVE | Noted: 2024-12-02

## 2024-12-02 NOTE — PROGRESS NOTES
[facility] Cardiology    Encounter Date: 2024    Patient ID: Sanmukhlal PARI Fagan is a 74 y.o. male.  : 1950     PCP: Sommer Sepulveda MD       Chief Complaint: Unstable angina pectoris      PROBLEM LIST:  Dyspnea on exertion:  MPS 2021: EF >70%. Normal study. Low risk study.  Echocardiogram 2021: LVEF 60%. Trace to mild MR. Mild TR with RVSP 37 mmHg.   [facility], 10/03/2024: EF 65%. No evidence of hemodynamic significant coronary disease.   Palpitations  Hyperlipidemia  Asthma  Chronic iron deficiency anemia (patient is vegetarian)    History of Present Illness  Patient presents today for a follow-up with a history of SUMMERS and cardiac risk factors. Since last visit, patient has been doing well overall from a cardiovascular standpoint. He stays busy and active by walking about an hour everyday. Patient is trying to stay on a low fat, healthier diet. He however continues to experience random shortness of breath was advised to see a pulmonologist due to unexplained shortness of breath. Patient denies chest pain, orthopnea, palpitations, edema, dizziness, and syncope.       No Known Allergies      Current Outpatient Medications:     albuterol sulfate  (90 Base) MCG/ACT inhaler, Inhale 2 puffs Every 4 (Four) Hours As Needed., Disp: , Rfl:     atorvastatin (LIPITOR) 10 MG tablet, Take 1 tablet by mouth Daily., Disp: , Rfl:     Calcium Citrate-Vitamin D3 (CITRACAL) 315-6.25 MG-MCG tablet tablet, Take 1 tablet by mouth Daily., Disp: , Rfl:     ferrous sulfate 325 (65 FE) MG tablet, Take 1 tablet by mouth 3 (Three) Times a Day With Meals., Disp: , Rfl:     latanoprost (XALATAN) 0.005 % ophthalmic solution, Administer 1 drop to both eyes Daily., Disp: , Rfl:     metoprolol succinate XL (TOPROL-XL) 25 MG 24 hr tablet, Take 1 tablet by mouth Daily., Disp: 90 tablet, Rfl: 3    montelukast (SINGULAIR) 10 MG tablet, Take 1 tablet by mouth Daily., Disp: , Rfl:     Multiple  "Vitamins-Minerals (ICAPS AREDS 2 PO), Take  by mouth Daily., Disp: , Rfl:     multivitamin with minerals tablet tablet, Take 1 tablet by mouth Daily., Disp: , Rfl:     nitroglycerin (NITROSTAT) 0.4 MG SL tablet, Take 1 tablet by mouth Every 5 (Five) Minutes As Needed., Disp: , Rfl:     Omega-3 Fatty Acids (fish oil) 1000 MG capsule capsule, Take 1 capsule by mouth 2 (Two) Times a Day With Meals., Disp: , Rfl:     The following portions of the patient's history were reviewed and updated as appropriate: allergies, current medications, past family history, past medical history, past social history, past surgical history and problem list.    ROS  Review of Systems   14 point ROS negative except for that listed in the HPI.         Objective:     /68   Pulse 68   Ht 157.5 cm (62\")   Wt 76.8 kg (169 lb 6.4 oz)   SpO2 96%   BMI 30.98 kg/m²      Physical Exam  General: No apparent distress.  Neck: no JVD.  Chest:No respiratory distress, breath sounds are normal. No wheezes,  rhonchi or rales.  Cardiovascular: Normal S1 and S2, no murmur, gallop or rub.    Extremities: No edema.      Data Review:   Lab Results   Component Value Date    GLUCOSE 103 (H) 10/03/2024    BUN 12 10/03/2024    CREATININE 0.99 10/03/2024    EGFR 78.9 10/03/2024    BCR 12.1 10/03/2024     10/03/2024    K 4.6 10/03/2024    CO2 25.0 10/03/2024    CALCIUM 9.6 10/03/2024    ALBUMIN 5.0 10/03/2024    AST 35 10/03/2024    ALT 27 10/03/2024     Lab Results   Component Value Date    WBC 11.88 (H) 10/03/2024    RBC 4.85 10/03/2024    HGB 10.9 (L) 10/03/2024    HCT 35.6 (L) 10/03/2024    MCV 73.4 (L) 10/03/2024     10/03/2024     Lab date: 8/20/2024  FLP: , , HDL 41, LDL 33    Procedures       Advance Care Planning   ACP discussion was declined by the patient. Patient does not have an advance directive, declines further assistance.           Assessment:      Diagnosis Plan   1. SUMMERS (dyspnea on exertion)  Negative cardiac " workup, ambulatory Referral to Pulmonology      2. Palpitations  Controlled, continue current dose of metoprolol      3. Dyslipidemia  Managed by PCP, continue current dose of Lipitor.  Lipid panel is on target.        Plan:   Stable cardiac status.  No current angina.  Has ongoing exertional dyspnea which is unexplained.  Referral to [facility] pulmonology for further evaluation.  Continue current medications.   Continue heart healthy diet and regular exercise.  FU in 12 MO, sooner as needed.  Thank you for allowing us to participate in the care of your patient.     Scribed for Mario Vu MD by Kashmir Fagan.    I, Mario Vu MD, personally performed the services described in this documentation as scribed by the above named individual in my presence, and it is both accurate and complete.  12/6/2024  09:23 EST      Please note that portions of this note may have been completed with a voice recognition program. Efforts were made to edit the dictations, but occasionally words are mistranscribed.

## 2024-12-06 ENCOUNTER — PATIENT ROUNDING (BHMG ONLY) (OUTPATIENT)
Dept: CARDIOLOGY | Facility: CLINIC | Age: 74
End: 2024-12-06
Payer: MEDICARE

## 2024-12-06 ENCOUNTER — OFFICE VISIT (OUTPATIENT)
Dept: CARDIOLOGY | Facility: CLINIC | Age: 74
End: 2024-12-06
Payer: MEDICARE

## 2024-12-06 VITALS
BODY MASS INDEX: 31.17 KG/M2 | DIASTOLIC BLOOD PRESSURE: 68 MMHG | HEART RATE: 68 BPM | HEIGHT: 62 IN | OXYGEN SATURATION: 96 % | SYSTOLIC BLOOD PRESSURE: 116 MMHG | WEIGHT: 169.4 LBS

## 2024-12-06 DIAGNOSIS — R00.2 PALPITATIONS: ICD-10-CM

## 2024-12-06 DIAGNOSIS — E78.5 DYSLIPIDEMIA: ICD-10-CM

## 2024-12-06 DIAGNOSIS — R06.09 DOE (DYSPNEA ON EXERTION): Primary | ICD-10-CM

## 2024-12-06 PROCEDURE — 99214 OFFICE O/P EST MOD 30 MIN: CPT | Performed by: INTERNAL MEDICINE

## 2024-12-06 PROCEDURE — 1160F RVW MEDS BY RX/DR IN RCRD: CPT | Performed by: INTERNAL MEDICINE

## 2024-12-06 PROCEDURE — 1159F MED LIST DOCD IN RCRD: CPT | Performed by: INTERNAL MEDICINE

## 2024-12-06 NOTE — PROGRESS NOTES
December 6, 2024    Hello, may I speak with Michael Fagan?    My name is LIZY      I am  with [facility] CARDIOLOGY  [address]  [facility] [address] [phone].    Before we get started may I verify your date of birth? 1950    I am calling to officially welcome you to our practice and ask about your recent visit. Is this a good time to talk? yes    Tell me about your visit with us. What things went well?  EVERYTHING WENT WELL.       We're always looking for ways to make our patients' experiences even better. Do you have recommendations on ways we may improve?  no    Overall were you satisfied with your first visit to our practice? yes       I appreciate you taking the time to speak with me today. Is there anything else I can do for you? no      Thank you, and have a great day.

## 2025-01-24 ENCOUNTER — OFFICE VISIT (OUTPATIENT)
Dept: PULMONOLOGY | Facility: CLINIC | Age: 75
End: 2025-01-24
Payer: MEDICARE

## 2025-01-24 VITALS
WEIGHT: 171 LBS | HEIGHT: 62 IN | TEMPERATURE: 97.2 F | OXYGEN SATURATION: 93 % | HEART RATE: 73 BPM | SYSTOLIC BLOOD PRESSURE: 124 MMHG | BODY MASS INDEX: 31.47 KG/M2 | DIASTOLIC BLOOD PRESSURE: 68 MMHG

## 2025-01-24 DIAGNOSIS — R06.09 DOE (DYSPNEA ON EXERTION): Primary | ICD-10-CM

## 2025-01-24 NOTE — PROGRESS NOTES
New Pulmonary Patient Office Visit      Patient Name: Michael Fagan    Referring Physician: No ref. provider found    Chief Complaint:    Chief Complaint   Patient presents with    Shortness of Breath     New Patient       History of Present Illness: Michael Fagan is a 74 y.o. male who is here today to establish care with Pulmonary.     74-year-old male patient of  origin which I was able to see as he understands [place] language.  He is accompanied by his daughter for the visit today.  Patient states that he is having problem with worsening shortness of breath going on for few months.  He states that if he is walking on level surface then he has no issues with his breathing but if he has to go up 1 flight of stairs then he feels that his breathing becomes difficult and he has to take frequent breaths to maintain his breathing and stop to catch his breath.  He also works out in the garden in the summertime and that sometimes can affect his breathing as well.  He does notice intermittent wheezing but denies any chest tightness or chest pain.  Denies any leg edema.  Does have knee arthritis problem.  Denies any history of asthma growing up.  He used to work as a  for 30+ years but has not worked in the industry for 20 years now.  He moved to [place] in 2010 and after leaving 1 year in [place] he moved to [place] and never since air and intermittent travel outside to [place] or [facility] where his other daughter lives.  Patient also has been found to have anemia and apparently has iron deficiency anemia.  His hemoglobin level is around 10.  He has undergone few iron infusions as well.    Patient denies any history of smoking.  No secondhand smoke exposure.  No history of asthma or seasonal allergies or significant rhinitis symptoms.  He has been placed on albuterol inhaler which she has used intermittently and does not notice if it makes a big difference in his breathing.   Denies any hospitalization from respiratory standpoint.  Denies any history of blood clots.  Recently underwent cardiac workup with Dr. Fischer and no epicardial coronary disease noted.  Mild RVSP elevation noted on the echocardiogram.  Patient does snore intermittently according to daughter.  He does feel extreme fatigue and tired during the daytime and intermittently naps.  May have underlying sleep apnea which will need to be looked at down the road.    Subjective      Review of Systems:   Review of Systems   Constitutional:  Positive for fatigue.   HENT: Negative.     Respiratory:  Positive for shortness of breath (with exertional activity).    Cardiovascular: Negative.    Gastrointestinal: Negative.    Endocrine: Negative.    Musculoskeletal:  Positive for arthralgias.   Skin: Negative.    Neurological: Negative.    Hematological: Negative.    Psychiatric/Behavioral: Negative.     All other systems reviewed and are negative.      Past Medical History:   Past Medical History:   Diagnosis Date    Asthma        Past Surgical History:   Past Surgical History:   Procedure Laterality Date    CARDIAC CATHETERIZATION N/A 10/3/2024    Procedure: Left Heart Cath;  Surgeon: Mario Vu MD;  Location: [facility] CATH INVASIVE LOCATION;  Service: Cardiology;  Laterality: N/A;    EYE SURGERY Bilateral     cataract removal       Family History:   Family History   Problem Relation Age of Onset    Heart attack Mother     Diabetes Father     Diabetes Brother     Diabetes Brother     Diabetes Brother     Diabetic kidney disease Brother     Skin cancer Brother        Social History:   Social History     Socioeconomic History    Marital status: Single   Tobacco Use    Smoking status: Never     Passive exposure: Never    Smokeless tobacco: Never   Vaping Use    Vaping status: Never Used   Substance and Sexual Activity    Alcohol use: Never    Drug use: Never    Sexual activity: Defer       Medications:     Current Outpatient  "Medications:     albuterol sulfate  (90 Base) MCG/ACT inhaler, Inhale 2 puffs Every 4 (Four) Hours As Needed., Disp: , Rfl:     atorvastatin (LIPITOR) 10 MG tablet, Take 1 tablet by mouth Daily., Disp: , Rfl:     Calcium Citrate-Vitamin D3 (CITRACAL) 315-6.25 MG-MCG tablet tablet, Take 1 tablet by mouth Daily., Disp: , Rfl:     ferrous sulfate 325 (65 FE) MG tablet, Take 1 tablet by mouth 3 (Three) Times a Day With Meals., Disp: , Rfl:     latanoprost (XALATAN) 0.005 % ophthalmic solution, Administer 1 drop to both eyes Daily., Disp: , Rfl:     metoprolol succinate XL (TOPROL-XL) 25 MG 24 hr tablet, Take 1 tablet by mouth Daily., Disp: 90 tablet, Rfl: 3    montelukast (SINGULAIR) 10 MG tablet, Take 1 tablet by mouth Daily., Disp: , Rfl:     Multiple Vitamins-Minerals (ICAPS AREDS 2 PO), Take  by mouth Daily., Disp: , Rfl:     multivitamin with minerals tablet tablet, Take 1 tablet by mouth Daily., Disp: , Rfl:     nitroglycerin (NITROSTAT) 0.4 MG SL tablet, Take 1 tablet by mouth Every 5 (Five) Minutes As Needed., Disp: , Rfl:     Omega-3 Fatty Acids (fish oil) 1000 MG capsule capsule, Take 1 capsule by mouth 2 (Two) Times a Day With Meals., Disp: , Rfl:     Allergies:   No Known Allergies    Objective     Physical Exam:  Vital Signs:   Vitals:    01/24/25 1000   BP: 124/68   Pulse: 73   Temp: 97.2 °F (36.2 °C)   TempSrc: Infrared   SpO2: 93%  Comment: room air at rest   Weight: 77.6 kg (171 lb)   Height: 157.5 cm (62.01\")  Comment: room air at rest     Body mass index is 31.27 kg/m².    Physical Exam  Vitals and nursing note reviewed.   Constitutional:       General: He is not in acute distress.     Appearance: He is well-developed. He is not diaphoretic.   HENT:      Head: Normocephalic and atraumatic.      Nose: Nose normal.   Eyes:      General:         Right eye: No discharge.         Left eye: No discharge.      Pupils: Pupils are equal, round, and reactive to light.   Neck:      Thyroid: No " thyromegaly.      Trachea: No tracheal deviation.   Cardiovascular:      Rate and Rhythm: Normal rate and regular rhythm.      Heart sounds: Normal heart sounds. No murmur heard.     No friction rub. No gallop.   Pulmonary:      Effort: Pulmonary effort is normal. No respiratory distress.      Breath sounds: Normal breath sounds. No wheezing or rales.   Musculoskeletal:         General: No tenderness.      Cervical back: Neck supple.      Right lower leg: No edema.      Left lower leg: No edema.   Neurological:      Mental Status: He is alert and oriented to person, place, and time.   Psychiatric:         Behavior: Behavior normal.         Thought Content: Thought content normal.         Judgment: Judgment normal.         Results Review:   I reviewed the patient's new clinical results.    Chest x-ray reviewed personally and showed likely right atrial enlargement slightly globular heart shadow.  Right middle lobe atelectasis with some rounded opacity.  No other pleural effusion or consolidation noted.    PFT IMPRESSION:   Patient had tough time performing the test.  There was some language barrier.  I was able to go in with the patient in the lab and instructed the patient how to perform the test he was able to do 1 good spirometry maneuver after that and showed nonspecific defect with possible obstruction.  Best FEV1 was 55% at 1.15  liter    Echocardiogram reviewed and showed normal ejection fraction.  RVSP of 37.  Trace MR.    Lab Results   Component Value Date    WBC 11.88 (H) 10/03/2024    HGB 10.9 (L) 10/03/2024    HCT 35.6 (L) 10/03/2024    MCV 73.4 (L) 10/03/2024     10/03/2024       Lab Results   Component Value Date    GLUCOSE 103 (H) 10/03/2024    BUN 12 10/03/2024    CREATININE 0.99 10/03/2024     10/03/2024    K 4.6 10/03/2024     10/03/2024    CALCIUM 9.6 10/03/2024    PROTEINTOT 8.1 10/03/2024    ALBUMIN 5.0 10/03/2024    ALT 27 10/03/2024    AST 35 10/03/2024    ALKPHOS 72  10/03/2024    BILITOT 1.7 (H) 10/03/2024    GLOB 3.1 10/03/2024    AGRATIO 1.6 10/03/2024    BCR 12.1 10/03/2024    ANIONGAP 12.0 10/03/2024    EGFR 78.9 10/03/2024       Assessment / Plan      Assessment:   Problem List Items Addressed This Visit          Cardiac and Vasculature    SUMMERS (dyspnea on exertion) - Primary    Relevant Orders    XR Chest PA & Lateral (Completed)    Spirometry (Completed)   CTA chest ordered.        Plan:   1.  Patient presenting with complains of worsening exertional dyspnea.  Multifactorial etiology with likely anemia playing a role, deconditioning could also be an issue.  Exercise-induced asthma cannot be ruled out.  Unfortunately unable to get full PFT study but limited study shows some nonspecific defect.  At this point we discussed trial of albuterol inhaler.  Advised that he should use the albuterol inhaler maybe 20 minutes prior to doing exertional activity where he gets short of breath.  He will attempt that.  I have provided him spacer to use with his albuterol inhaler to see if that gets better drug delivery and see if that makes a difference in his breathing.  He denies any side effects from medications however.  Advised that he could take it up to 3 or 4 times a day as needed.  He will try that and if that works for him then we could consider more long-acting inhaler therapy for him.  2.  Advised patient to stay active to prevent further deconditioning.  3.  Chest x-ray showing possible right middle lobe atelectasis.   I would like to get a CT scan of the chest to evaluate for any other pulmonary pathology we could be missing.  I will get CT angiogram to rule out any chronic thromboembolic disease as well given his right-sided pressures are little bit high.  If this workup is negative then we will consider sleep study down the road.  4.  Follow-up with primary care for management of anemia.    I will be in touch with him once I get the CT scan report available to review.   Otherwise see him back in about 3 months or sooner as needed.  Thank you for allowing me to participate in the care of this patient    Follow Up:   3 months or sooner as needed.     Discussed plan of care in detail with patient today. Patient verbally understands and agrees. I spent 50 minutes on this date of service. This time includes time spent by me in the following activities:preparing for the visit, reviewing tests, obtaining and/or reviewing a separately obtained history, performing a medically appropriate examination, counseling the patient/family, ordering medications, tests, or procedures, and/or documenting information in the medical record. This time excludes other separate billable services such as interpretation of tests or procedures, if applicable.        Sabino Grubbs MD  Pulmonary Critical Care and Sleep Medicine

## 2025-01-27 DIAGNOSIS — R06.09 DOE (DYSPNEA ON EXERTION): Primary | ICD-10-CM

## 2025-02-01 ENCOUNTER — HOSPITAL ENCOUNTER (OUTPATIENT)
Facility: HOSPITAL | Age: 75
Discharge: HOME | End: 2025-02-01
Payer: MEDICARE

## 2025-02-01 DIAGNOSIS — R06.09 DOE (DYSPNEA ON EXERTION): ICD-10-CM

## 2025-02-01 PROCEDURE — 25510000001 IOPAMIDOL PER 1 ML: Performed by: INTERNAL MEDICINE

## 2025-02-01 PROCEDURE — 71275 CT ANGIOGRAPHY CHEST: CPT

## 2025-02-01 PROCEDURE — 71250 CT THORAX DX C-: CPT

## 2025-02-01 RX ORDER — IOPAMIDOL 755 MG/ML
100 INJECTION, SOLUTION INTRAVASCULAR
Status: COMPLETED | OUTPATIENT
Start: 2025-02-01 | End: 2025-02-01

## 2025-02-01 RX ADMIN — IOPAMIDOL 85 ML: 755 INJECTION, SOLUTION INTRAVENOUS at 09:30

## 2025-02-03 ENCOUNTER — TELEPHONE (OUTPATIENT)
Dept: PULMONOLOGY | Facility: CLINIC | Age: 75
End: 2025-02-03
Payer: MEDICARE

## 2025-04-04 ENCOUNTER — OFFICE VISIT (OUTPATIENT)
Dept: PULMONOLOGY | Facility: CLINIC | Age: 75
End: 2025-04-04
Payer: MEDICARE

## 2025-04-04 VITALS
TEMPERATURE: 97.8 F | HEART RATE: 78 BPM | BODY MASS INDEX: 31.47 KG/M2 | WEIGHT: 171 LBS | DIASTOLIC BLOOD PRESSURE: 68 MMHG | SYSTOLIC BLOOD PRESSURE: 128 MMHG | OXYGEN SATURATION: 95 % | HEIGHT: 62 IN

## 2025-04-04 DIAGNOSIS — R06.02 SHORTNESS OF BREATH: Primary | ICD-10-CM

## 2025-04-04 DIAGNOSIS — J45.990 EXERCISE-INDUCED ASTHMA: ICD-10-CM

## 2025-04-04 RX ORDER — BUDESONIDE AND FORMOTEROL FUMARATE DIHYDRATE 80; 4.5 UG/1; UG/1
2 AEROSOL RESPIRATORY (INHALATION) 2 TIMES DAILY
Qty: 1 EACH | Refills: 6 | Status: SHIPPED | OUTPATIENT
Start: 2025-04-04

## 2025-04-04 NOTE — PROGRESS NOTES
Pulmonary Patient Office Visit      Patient Name: Michael Fagan    Referring Physician: No ref. provider found    Chief Complaint:    Chief Complaint   Patient presents with    Shortness of Breath    Follow-up       History of Present Illness: Michael Fagan is a 74 y.o. male who is here today to follow up on shortness of breath     74-year-old male patient of  origin which I was able to see as he understands [place] language.  He is accompanied by his daughter for the visit today.  Patient states that he is having problem with worsening shortness of breath going on for few months.  He states that if he is walking on level surface then he has no issues with his breathing but if he has to go up 1 flight of stairs then he feels that his breathing becomes difficult and he has to take frequent breaths to maintain his breathing and stop to catch his breath.  He also works out in the garden in the summertime and that sometimes can affect his breathing as well.  He does notice intermittent wheezing but denies any chest tightness or chest pain.  Denies any leg edema.  Does have knee arthritis problem.  Denies any history of asthma growing up.  He used to work as a  for 30+ years but has not worked in the industry for 20 years now.  He moved to [place] in 2010 and after leaving 1 year in [place] he moved to [place] and never since air and intermittent travel outside to [place] or [facility] where his other daughter lives.  Patient also has been found to have anemia and apparently has iron deficiency anemia.  His hemoglobin level is around 10.  He has undergone few iron infusions as well.    Patient denies any history of smoking.  No secondhand smoke exposure.  No history of asthma or seasonal allergies or significant rhinitis symptoms.  He has been placed on albuterol inhaler which she has used intermittently and does not notice if it makes a big difference in his breathing.   Denies any hospitalization from respiratory standpoint.  Denies any history of blood clots.  Recently underwent cardiac workup with Dr. Vu and no epicardial coronary disease noted.  Mild RVSP elevation noted on the echocardiogram.  Patient does snore intermittently according to daughter.     Patient had tough time performing pulmonary function testing.  We had given him albuterol inhaler to try before his exercise routine to see if that will help him go longer and today he comes for follow-up and states that he felt that albuterol inhaler helped his breathing.  He recently was in [place] visiting his other daughter and he developed cough and cold symptoms when he was seen by local physician there.  He was placed on antibiotic for possible acute bronchitis and Symbicort inhaler and he is still taking that after arrival here.  He states that Symbicort inhaler is working well for him.  He feels that his breathing is little better with the use of Symbicort inhaler.  He is only on 80 mcg dose and only using 2 puffs once a day.  Denies any cough.  He is walking inside the house for 40 to 45 minutes.  Does not walk outside because of his vision issues.  He states that he gets busy in the summertime with gardening and that can also affect his breathing.  Denies any pneumonia.  Denies any other cough symptoms currently.    Subjective      Review of Systems:   Review of Systems   Constitutional:  Positive for fatigue.   HENT: Negative.     Respiratory:  Positive for shortness of breath (with exertional activity).    Cardiovascular: Negative.    Gastrointestinal: Negative.    Endocrine: Negative.    Musculoskeletal:  Positive for arthralgias.   Skin: Negative.    Neurological: Negative.    Hematological: Negative.    Psychiatric/Behavioral: Negative.     All other systems reviewed and are negative.      Past Medical History:   Past Medical History:   Diagnosis Date    Asthma        Past Surgical History:   Past Surgical  History:   Procedure Laterality Date    CARDIAC CATHETERIZATION N/A 10/3/2024    Procedure: Left Heart Cath;  Surgeon: Mario Vu MD;  Location: [facility] CATH INVASIVE LOCATION;  Service: Cardiology;  Laterality: N/A;    EYE SURGERY Bilateral     cataract removal       Family History:   Family History   Problem Relation Age of Onset    Heart attack Mother     Diabetes Father     Diabetes Brother     Diabetes Brother     Diabetes Brother     Diabetic kidney disease Brother     Skin cancer Brother        Social History:   Social History     Socioeconomic History    Marital status:    Tobacco Use    Smoking status: Never     Passive exposure: Never    Smokeless tobacco: Never   Vaping Use    Vaping status: Never Used   Substance and Sexual Activity    Alcohol use: Never    Drug use: Never    Sexual activity: Defer       Medications:     Current Outpatient Medications:     albuterol sulfate  (90 Base) MCG/ACT inhaler, Inhale 2 puffs Every 4 (Four) Hours As Needed., Disp: , Rfl:     atorvastatin (LIPITOR) 10 MG tablet, Take 1 tablet by mouth Daily., Disp: , Rfl:     Calcium Citrate-Vitamin D3 (CITRACAL) 315-6.25 MG-MCG tablet tablet, Take 1 tablet by mouth Daily., Disp: , Rfl:     ferrous sulfate 325 (65 FE) MG tablet, Take 1 tablet by mouth 3 (Three) Times a Day With Meals., Disp: , Rfl:     latanoprost (XALATAN) 0.005 % ophthalmic solution, Administer 1 drop to both eyes Daily., Disp: , Rfl:     metoprolol succinate XL (TOPROL-XL) 25 MG 24 hr tablet, Take 1 tablet by mouth Daily., Disp: 90 tablet, Rfl: 3    montelukast (SINGULAIR) 10 MG tablet, Take 1 tablet by mouth Daily., Disp: , Rfl:     Multiple Vitamins-Minerals (ICAPS AREDS 2 PO), Take  by mouth Daily., Disp: , Rfl:     multivitamin with minerals tablet tablet, Take 1 tablet by mouth Daily., Disp: , Rfl:     nitroglycerin (NITROSTAT) 0.4 MG SL tablet, Take 1 tablet by mouth Every 5 (Five) Minutes As Needed., Disp: , Rfl:     Omega-3 Fatty Acids  "(fish oil) 1000 MG capsule capsule, Take 1 capsule by mouth 2 (Two) Times a Day With Meals., Disp: , Rfl:     budesonide-formoterol (SYMBICORT) 80-4.5 MCG/ACT inhaler, Inhale 2 puffs 2 (Two) Times a Day., Disp: 1 each, Rfl: 6    Allergies:   No Known Allergies    Objective     Physical Exam:  Vital Signs:   Vitals:    04/04/25 1551   BP: 128/68   Pulse: 78   Temp: 97.8 °F (36.6 °C)   SpO2: 95%  Comment: resting, room air   Weight: 77.6 kg (171 lb)   Height: 157.5 cm (62.01\")     Body mass index is 31.27 kg/m².    Physical Exam  Vitals and nursing note reviewed.   Constitutional:       General: He is not in acute distress.     Appearance: He is well-developed. He is not diaphoretic.   HENT:      Head: Normocephalic and atraumatic.      Nose: Nose normal.   Eyes:      General:         Right eye: No discharge.         Left eye: No discharge.      Pupils: Pupils are equal, round, and reactive to light.   Neck:      Thyroid: No thyromegaly.      Trachea: No tracheal deviation.   Cardiovascular:      Rate and Rhythm: Normal rate and regular rhythm.      Heart sounds: Normal heart sounds. No murmur heard.     No friction rub. No gallop.   Pulmonary:      Effort: Pulmonary effort is normal. No respiratory distress.      Breath sounds: Normal breath sounds. No wheezing or rales.   Musculoskeletal:         General: No tenderness.      Cervical back: Neck supple.      Right lower leg: No edema.      Left lower leg: No edema.   Neurological:      Mental Status: He is alert and oriented to person, place, and time.   Psychiatric:         Behavior: Behavior normal.         Thought Content: Thought content normal.         Judgment: Judgment normal.         Results Review:   I reviewed the patient's new clinical results.    CTA chest reviewed personally and also reviewed with patient in detail.  Patient did not have any pulmonary embolism.  No evidence of bronchial wall thickening or bronchiectasis.  No evidence of pulmonary nodules " or pleural effusions or any significant parenchymal abnormality.    PFT IMPRESSION:   Patient had tough time performing the test.  There was some language barrier.  I was able to go in with the patient in the lab and instructed the patient how to perform the test he was able to do 1 good spirometry maneuver after that and showed nonspecific defect with possible obstruction.  Best FEV1 was 55% at 1.15  liter    Echocardiogram reviewed and showed normal ejection fraction.  RVSP of 37.  Trace MR.    Lab Results   Component Value Date    WBC 11.88 (H) 10/03/2024    HGB 10.9 (L) 10/03/2024    HCT 35.6 (L) 10/03/2024    MCV 73.4 (L) 10/03/2024     10/03/2024       Lab Results   Component Value Date    GLUCOSE 103 (H) 10/03/2024    BUN 12 10/03/2024    CREATININE 0.99 10/03/2024     10/03/2024    K 4.6 10/03/2024     10/03/2024    CALCIUM 9.6 10/03/2024    PROTEINTOT 8.1 10/03/2024    ALBUMIN 5.0 10/03/2024    ALT 27 10/03/2024    AST 35 10/03/2024    ALKPHOS 72 10/03/2024    BILITOT 1.7 (H) 10/03/2024    GLOB 3.1 10/03/2024    AGRATIO 1.6 10/03/2024    BCR 12.1 10/03/2024    ANIONGAP 12.0 10/03/2024    EGFR 78.9 10/03/2024       Assessment / Plan      Assessment:   Problem List Items Addressed This Visit    None  Visit Diagnoses         Shortness of breath    -  Primary      Exercise-induced asthma        Relevant Medications    budesonide-formoterol (SYMBICORT) 80-4.5 MCG/ACT inhaler               Plan:   1.  Patient presented with complains of worsening exertional dyspnea.  Multifactorial etiology with likely anemia playing a role, deconditioning could also be an issue.  Exercise-induced asthma cannot be ruled out.  Unfortunately unable to get full PFT study but limited study shows some nonspecific defect.  Patient apparently benefited from albuterol inhaler with improved exercise capacity making me wonder if we are dealing with exercise-induced asthma.  Patient also was in [place] and also had  episode of acute bronchitis which was treated with antibiotics and inhaled steroids.  He feels that inhaled steroids are also helping his breathing but he is only taking very low-dose currently.  I did represcribe him Symbicort inhaler 80 over 4.52 puffs twice a day.  If he is comfortable with taking just once a day we can continue that for now and if he starts to have more issues then advised to increase to twice a day.  He verbalized understanding.  He is doing well with the inhaler.  Rinsing mouth after as well.  Once he starts gardening advised to take albuterol inhaler at least 15 to 20 minutes before he goes out for gardening and see if that helps to work out longer.  He verbalized understanding.  Reassured patient and his daughter that we are not seeing anything serious at this point but we will keep close eye on him to make sure no other etiology is found later on.  2.  Advised patient to stay active to prevent further deconditioning.  3.  Chest x-ray was concerning for possible right middle lobe atelectasis versus bronchiectasis but CT scan does not confirm that finding which is reassuring.  Reviewed in detail with patient and his daughter.  No pulmonary embolism noted either.  4.  Follow-up with primary care for management of anemia.     continue to follow in pulmonary clinic will see him back in 6 months or sooner as needed.  Prescriptions called in.    Follow Up:   6 months or sooner as needed.     Discussed plan of care in detail with patient today. Patient verbally understands and agrees. I spent 30 minutes on this date of service. This time includes time spent by me in the following activities:preparing for the visit, reviewing tests, obtaining and/or reviewing a separately obtained history, performing a medically appropriate examination, counseling the patient/family, ordering medications, tests, or procedures, and/or documenting information in the medical record. This time excludes other separate  billable services such as interpretation of tests or procedures, if applicable.        Sabino Grubbs MD  Pulmonary Critical Care and Sleep Medicine

## (undated) DEVICE — MODEL AT P65, P/N 701554-001KIT CONTENTS: HAND CONTROLLER, 3-WAY HIGH-PRESSURE STOPCOCK WITH ROTATING END AND PREMIUM HIGH-PRESSURE TUBING: Brand: ANGIOTOUCH® KIT

## (undated) DEVICE — Device

## (undated) DEVICE — ADULT, W/LG. BACK PAD, RADIOTRANSPARENT ELEMENT AND LEAD WIRE COMPATIBLE W/: Brand: DEFIBRILLATION ELECTRODES

## (undated) DEVICE — CATH DIAG EXPO .045 FL3  5F 100CM

## (undated) DEVICE — TR BAND RADIAL ARTERY COMPRESSION DEVICE: Brand: TR BAND

## (undated) DEVICE — INTRO SHEATH PRELUDE IDEAL SPRNG COIL 021 6F 23X80CM

## (undated) DEVICE — PK CATH CARD 10

## (undated) DEVICE — RADIFOCUS GLIDEWIRE: Brand: GLIDEWIRE

## (undated) DEVICE — MODEL BT2000 P/N 700287-012KIT CONTENTS: MANIFOLD WITH SALINE AND CONTRAST PORTS, SALINE TUBING WITH SPIKE AND HAND SYRINGE, TRANSDUCER: Brand: BT2000 AUTOMATED MANIFOLD KIT

## (undated) DEVICE — GW PERIPH GUIDERIGHT STD/EXCHNG/J/TIP SS 0.035IN 5X260CM

## (undated) DEVICE — CATH DIAG EXPO M/ PK 5F FL4/FR4 PIG

## (undated) DEVICE — PINNACLE INTRODUCER SHEATH: Brand: PINNACLE